# Patient Record
Sex: MALE | Race: WHITE | Employment: STUDENT | URBAN - METROPOLITAN AREA
[De-identification: names, ages, dates, MRNs, and addresses within clinical notes are randomized per-mention and may not be internally consistent; named-entity substitution may affect disease eponyms.]

---
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
## 2025-06-23 DIAGNOSIS — M79.671 RIGHT FOOT PAIN: Primary | ICD-10-CM

## 2025-06-26 ENCOUNTER — OFFICE VISIT (OUTPATIENT)
Age: 20
End: 2025-06-26
Payer: COMMERCIAL

## 2025-06-26 VITALS — TEMPERATURE: 97.9 F | HEIGHT: 76 IN | WEIGHT: 195 LBS | BODY MASS INDEX: 23.75 KG/M2

## 2025-06-26 DIAGNOSIS — S90.121A CONTUSION OF FIFTH TOE OF RIGHT FOOT, INITIAL ENCOUNTER: Primary | ICD-10-CM

## 2025-06-26 PROCEDURE — 99213 OFFICE O/P EST LOW 20 MIN: CPT | Performed by: ORTHOPAEDIC SURGERY

## 2025-06-26 NOTE — PROGRESS NOTES
Chief Complaint   Patient presents with    Foot Pain     Pt presents to office today for right foot pain. Pain started about a week ago during basketball.        Jameson Mcmahon  presents for initial evaluation of his right foot.  He started playing basketball after year layoff several weeks ago and noticed severe pain on the outside of his right foot.  No trauma that he can recall.       No past medical history on file.  Past Surgical History:   Procedure Laterality Date    KNEE ARTHROSCOPY Left 05/06/2025    Clean out.    KNEE ARTHROSCOPY W/ ACL RECONSTRUCTION Left 08/2024     No current outpatient medications on file.  No Known Allergies  Social History     Socioeconomic History    Marital status: Single     Spouse name: Not on file    Number of children: Not on file    Years of education: Not on file    Highest education level: Not on file   Occupational History    Not on file   Tobacco Use    Smoking status: Never    Smokeless tobacco: Never   Substance and Sexual Activity    Alcohol use: No    Drug use: Never    Sexual activity: Not on file   Other Topics Concern    Not on file   Social History Narrative    Not on file     Social Drivers of Health     Financial Resource Strain: Not on file   Food Insecurity: Not on file   Transportation Needs: Not on file   Physical Activity: Not on file   Stress: Not on file   Social Connections: Not on file   Intimate Partner Violence: Not on file   Housing Stability: Not on file     No family history on file.  There is no problem list on file for this patient.       Review of Systems:   As follows except as previously noted in HPI:  Constitutional: Negative for chills, diaphoresis,  fever   Respiratory: Negative for cough, shortness of breath and wheezing.    Cardiovascular: Negative for chest pain and palpitations.   Neurological: Negative for dizziness, syncope,   GI / : abdominal pain or cramping  Musculoskeletal: see HPI     Physical Exam:   Constitutional: The

## 2025-07-14 DIAGNOSIS — S90.121A CONTUSION OF FIFTH TOE OF RIGHT FOOT, INITIAL ENCOUNTER: Primary | ICD-10-CM

## 2025-07-17 ENCOUNTER — OFFICE VISIT (OUTPATIENT)
Age: 20
End: 2025-07-17
Payer: COMMERCIAL

## 2025-07-17 VITALS — TEMPERATURE: 97.1 F | BODY MASS INDEX: 23.74 KG/M2 | HEIGHT: 76 IN

## 2025-07-17 DIAGNOSIS — S90.121A CONTUSION OF FIFTH TOE OF RIGHT FOOT, INITIAL ENCOUNTER: Primary | ICD-10-CM

## 2025-07-17 PROCEDURE — 99213 OFFICE O/P EST LOW 20 MIN: CPT | Performed by: ORTHOPAEDIC SURGERY

## 2025-07-17 NOTE — PROGRESS NOTES
Chief Complaint   Patient presents with    Follow-up     Pt presents to office today for f/u right foot - 5th toe fx. Pt states he is still having pain with basketball. Currently ambulating without assistance and regular shoes.        Jameson Mcmahon returns today for follow-up of right foot.  He says his getting better he still has pain with pushoff.      No past medical history on file.  Past Surgical History:   Procedure Laterality Date    KNEE ARTHROSCOPY Left 05/06/2025    Clean out.    KNEE ARTHROSCOPY W/ ACL RECONSTRUCTION Left 08/2024     No current outpatient medications on file.  No Known Allergies  Social History     Socioeconomic History    Marital status: Single     Spouse name: Not on file    Number of children: Not on file    Years of education: Not on file    Highest education level: Not on file   Occupational History    Not on file   Tobacco Use    Smoking status: Never    Smokeless tobacco: Never   Substance and Sexual Activity    Alcohol use: No    Drug use: Never    Sexual activity: Not on file   Other Topics Concern    Not on file   Social History Narrative    Not on file     Social Drivers of Health     Financial Resource Strain: Not on file   Food Insecurity: Not on file   Transportation Needs: Not on file   Physical Activity: Not on file   Stress: Not on file   Social Connections: Not on file   Intimate Partner Violence: Not on file   Housing Stability: Not on file     No family history on file.  There is no problem list on file for this patient.       Review of Systems:   As follows except as previously noted in HPI:  Constitutional: Negative for chills, diaphoresis,  fever   Respiratory: Negative for cough, shortness of breath and wheezing.    Cardiovascular: Negative for chest pain and palpitations.   Neurological: Negative for dizziness, syncope,   GI / : abdominal pain or cramping  Musculoskeletal: see HPI     Physical Exam:   Constitutional: The patient is alert and oriented x 3,